# Patient Record
Sex: MALE
[De-identification: names, ages, dates, MRNs, and addresses within clinical notes are randomized per-mention and may not be internally consistent; named-entity substitution may affect disease eponyms.]

---

## 2020-12-27 ENCOUNTER — HOSPITAL ENCOUNTER (EMERGENCY)
Dept: HOSPITAL 92 - ERS | Age: 65
Discharge: HOME | End: 2020-12-27
Payer: MEDICARE

## 2020-12-27 DIAGNOSIS — Z79.82: ICD-10-CM

## 2020-12-27 DIAGNOSIS — F03.90: ICD-10-CM

## 2020-12-27 DIAGNOSIS — E11.9: ICD-10-CM
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
## 2020-12-27 DIAGNOSIS — S01.81XA: Primary | ICD-10-CM

## 2020-12-27 DIAGNOSIS — Z79.899: ICD-10-CM

## 2020-12-27 DIAGNOSIS — I10: ICD-10-CM

## 2020-12-27 PROCEDURE — 12013 RPR F/E/E/N/L/M 2.6-5.0 CM: CPT

## 2020-12-27 PROCEDURE — 70450 CT HEAD/BRAIN W/O DYE: CPT

## 2020-12-27 NOTE — CT
CT Brain WO Con:



12/27/2020 2:30 PM



CLINICAL HISTORY: Head injury.



IMAGING TECHNIQUE: Multiple CT images were obtained of the brain without IV contrast.



COMPARISON: Prior CT the brain dated every 20th 2020



FINDINGS:



BRAIN:  

Evidence of acute infarct:  None.

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
ally are stable.

Evidence of intracranial hemorrhage:  None.

Evidence of brain volume loss:There is mild generalized cerebral and cerebellar atrophy.

Evidence of midline shift:  Third ventricle and septum pellucidum are midline.

Ventricles:  Normal.  No hydrocephalus.



SKULL:  Intact.



VISUALIZED PARANASAL SINUSES:  There is mild mucosal thickening in the sphenoid sinus.



MASTOID AIR CELLS:  There is partial effusion of the right mastoid air cells which is stable.



EXTRACRANIAL SOFT TISSUES:  Normal.



IMPRESSION:



No acute intracranial abnormality. 



Reported By: Harris Noriega 

Electronically Signed:  12/27/2020 2:42 PM